# Patient Record
Sex: FEMALE | Race: WHITE
[De-identification: names, ages, dates, MRNs, and addresses within clinical notes are randomized per-mention and may not be internally consistent; named-entity substitution may affect disease eponyms.]

---

## 2017-09-21 ENCOUNTER — HOSPITAL ENCOUNTER (OUTPATIENT)
Dept: HOSPITAL 84 - D.MAMMO | Age: 67
Discharge: HOME | End: 2017-09-21
Attending: FAMILY MEDICINE
Payer: MEDICARE

## 2017-09-21 VITALS — BODY MASS INDEX: 31.2 KG/M2

## 2017-09-21 DIAGNOSIS — Z12.31: Primary | ICD-10-CM

## 2019-04-05 ENCOUNTER — HOSPITAL ENCOUNTER (OUTPATIENT)
Dept: HOSPITAL 84 - D.RAD | Age: 69
Discharge: HOME | End: 2019-04-05
Attending: ORTHOPAEDIC SURGERY
Payer: MEDICARE

## 2019-04-05 VITALS — BODY MASS INDEX: 31.2 KG/M2

## 2019-04-05 DIAGNOSIS — M53.3: Primary | ICD-10-CM

## 2019-07-31 ENCOUNTER — HOSPITAL ENCOUNTER (OUTPATIENT)
Dept: HOSPITAL 84 - D.MAMMO | Age: 69
Discharge: HOME | End: 2019-07-31
Attending: FAMILY MEDICINE
Payer: MEDICARE

## 2019-07-31 VITALS — BODY MASS INDEX: 31.2 KG/M2

## 2019-07-31 DIAGNOSIS — Z12.31: Primary | ICD-10-CM

## 2020-07-13 ENCOUNTER — HOSPITAL ENCOUNTER (OUTPATIENT)
Dept: HOSPITAL 84 - D.LABREF | Age: 70
Discharge: HOME | End: 2020-07-13
Attending: ORTHOPAEDIC SURGERY
Payer: MEDICARE

## 2020-07-13 ENCOUNTER — HOSPITAL ENCOUNTER (INPATIENT)
Dept: HOSPITAL 84 - D.SDCHOLD | Age: 70
LOS: 24 days | Discharge: HOME HEALTH | DRG: 470 | End: 2020-08-06
Attending: ORTHOPAEDIC SURGERY | Admitting: ORTHOPAEDIC SURGERY
Payer: MEDICARE

## 2020-07-13 VITALS — BODY MASS INDEX: 29.8 KG/M2 | HEIGHT: 72 IN | BODY MASS INDEX: 29.8 KG/M2 | WEIGHT: 220 LBS

## 2020-07-13 VITALS — BODY MASS INDEX: 31.2 KG/M2

## 2020-07-13 DIAGNOSIS — I10: ICD-10-CM

## 2020-07-13 DIAGNOSIS — E78.5: ICD-10-CM

## 2020-07-13 DIAGNOSIS — M17.11: Primary | ICD-10-CM

## 2020-07-13 DIAGNOSIS — F03.90: ICD-10-CM

## 2020-07-13 DIAGNOSIS — K58.9: ICD-10-CM

## 2020-07-13 DIAGNOSIS — E87.5: ICD-10-CM

## 2020-07-13 DIAGNOSIS — E55.9: ICD-10-CM

## 2020-07-13 DIAGNOSIS — F32.9: ICD-10-CM

## 2020-07-15 LAB
ANION GAP SERPL CALC-SCNC: 9.2 MMOL/L (ref 8–16)
APTT BLD: 24.3 SECONDS (ref 22.8–39.4)
BILIRUB SERPL-MCNC: NEGATIVE MG/DL
BUN SERPL-MCNC: 20 MG/DL (ref 7–18)
CALCIUM SERPL-MCNC: 9.2 MG/DL (ref 8.5–10.1)
CHLORIDE SERPL-SCNC: 104 MMOL/L (ref 98–107)
CO2 SERPL-SCNC: 33.6 MMOL/L (ref 21–32)
CREAT SERPL-MCNC: 1.1 MG/DL (ref 0.6–1.3)
ERYTHROCYTE [DISTWIDTH] IN BLOOD BY AUTOMATED COUNT: 13 % (ref 11.5–14.5)
GLUCOSE SERPL-MCNC: 95 MG/DL (ref 74–106)
GLUCOSE SERPL-MCNC: NEGATIVE MG/DL
HCT VFR BLD CALC: 45 % (ref 36–48)
HGB BLD-MCNC: 15.2 G/DL (ref 12–16)
INR PPP: 0.89 (ref 0.85–1.17)
KETONES UR STRIP-MCNC: NEGATIVE MG/DL
LYMPHOCYTES NFR BLD AUTO: 30.6 % (ref 15–50)
MCH RBC QN AUTO: 31.8 PG (ref 26–34)
MCHC RBC AUTO-ENTMCNC: 33.8 G/DL (ref 31–37)
MCV RBC: 94.1 FL (ref 80–100)
NEUTROPHILS NFR BLD AUTO: 60.4 % (ref 40–80)
NITRITE UR-MCNC: NEGATIVE MG/ML
OSMOLALITY SERPL CALC.SUM OF ELEC: 287 MOSM/KG (ref 275–300)
PH UR STRIP: 5 [PH] (ref 5–6)
PLATELET # BLD: 308 10X3/UL (ref 130–400)
PMV BLD AUTO: 9.7 FL (ref 7.4–10.4)
POTASSIUM SERPL-SCNC: 3.8 MMOL/L (ref 3.5–5.1)
PROTHROMBIN TIME: 12 SECONDS (ref 11.6–15)
RBC # BLD AUTO: 4.78 10X6/UL (ref 4–5.4)
SODIUM SERPL-SCNC: 143 MMOL/L (ref 136–145)
UROBILINOGEN UR-MCNC: NORMAL MG/DL
WBC # BLD AUTO: 8 10X3/UL (ref 4.8–10.8)

## 2020-07-15 NOTE — NUR
HR- 67, POX- 97%, RR- 16, BP RA- 186/84
PT STATED BP WASN'T NORMAL FOR HER BUT HAS BEEN STRESSED WITH WORK AND HAVING
PAIN IN KNEE. 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 ENCOURAGED TO
CHECK AT HOME TONIGHT AND DAILY TIL DOWN TO NORMAL RANGE FOR HER.

## 2020-08-03 LAB
ANION GAP SERPL CALC-SCNC: 9.2 MMOL/L (ref 8–16)
APTT BLD: 25.4 SECONDS (ref 22.8–39.4)
BASOPHILS NFR BLD AUTO: 0.5 % (ref 0–2)
BILIRUB SERPL-MCNC: NEGATIVE MG/DL
BUN SERPL-MCNC: 22 MG/DL (ref 7–18)
CALCIUM SERPL-MCNC: 9.1 MG/DL (ref 8.5–10.1)
CHLORIDE SERPL-SCNC: 106 MMOL/L (ref 98–107)
CO2 SERPL-SCNC: 34 MMOL/L (ref 21–32)
CREAT SERPL-MCNC: 1 MG/DL (ref 0.6–1.3)
EOSINOPHIL NFR BLD: 2.9 % (ref 0–7)
ERYTHROCYTE [DISTWIDTH] IN BLOOD BY AUTOMATED COUNT: 12.8 % (ref 11.5–14.5)
GLUCOSE SERPL-MCNC: 89 MG/DL (ref 74–106)
GLUCOSE SERPL-MCNC: NEGATIVE MG/DL
HCT VFR BLD CALC: 44.4 % (ref 36–48)
HGB BLD-MCNC: 14.9 G/DL (ref 12–16)
IMM GRANULOCYTES NFR BLD: 0.3 % (ref 0–5)
INR PPP: 0.89 (ref 0.85–1.17)
KETONES UR STRIP-MCNC: NEGATIVE MG/DL
LYMPHOCYTES NFR BLD AUTO: 32 % (ref 15–50)
MCH RBC QN AUTO: 32.4 PG (ref 26–34)
MCHC RBC AUTO-ENTMCNC: 33.6 G/DL (ref 31–37)
MCV RBC: 96.5 FL (ref 80–100)
MONOCYTES NFR BLD: 11.6 % (ref 2–11)
NEUTROPHILS NFR BLD AUTO: 52.7 % (ref 40–80)
NITRITE UR-MCNC: NEGATIVE MG/ML
OSMOLALITY SERPL CALC.SUM OF ELEC: 288 MOSM/KG (ref 275–300)
PH UR STRIP: 5 [PH] (ref 5–6)
PLATELET # BLD: 245 10X3/UL (ref 130–400)
PMV BLD AUTO: 10 FL (ref 7.4–10.4)
POTASSIUM SERPL-SCNC: 5.2 MMOL/L (ref 3.5–5.1)
PROTHROMBIN TIME: 12 SECONDS (ref 11.6–15)
RBC # BLD AUTO: 4.6 10X6/UL (ref 4–5.4)
SODIUM SERPL-SCNC: 144 MMOL/L (ref 136–145)
UROBILINOGEN UR-MCNC: NORMAL MG/DL
WBC # BLD AUTO: 6.6 10X3/UL (ref 4.8–10.8)

## 2020-08-04 VITALS — SYSTOLIC BLOOD PRESSURE: 127 MMHG | DIASTOLIC BLOOD PRESSURE: 59 MMHG

## 2020-08-04 VITALS — SYSTOLIC BLOOD PRESSURE: 140 MMHG | DIASTOLIC BLOOD PRESSURE: 80 MMHG

## 2020-08-04 VITALS — DIASTOLIC BLOOD PRESSURE: 63 MMHG | SYSTOLIC BLOOD PRESSURE: 118 MMHG

## 2020-08-04 VITALS — SYSTOLIC BLOOD PRESSURE: 113 MMHG | DIASTOLIC BLOOD PRESSURE: 60 MMHG

## 2020-08-04 VITALS — SYSTOLIC BLOOD PRESSURE: 116 MMHG | DIASTOLIC BLOOD PRESSURE: 67 MMHG

## 2020-08-04 VITALS — DIASTOLIC BLOOD PRESSURE: 92 MMHG | SYSTOLIC BLOOD PRESSURE: 162 MMHG

## 2020-08-04 VITALS — SYSTOLIC BLOOD PRESSURE: 125 MMHG | DIASTOLIC BLOOD PRESSURE: 82 MMHG

## 2020-08-04 VITALS — DIASTOLIC BLOOD PRESSURE: 88 MMHG | SYSTOLIC BLOOD PRESSURE: 140 MMHG

## 2020-08-04 VITALS — SYSTOLIC BLOOD PRESSURE: 101 MMHG | DIASTOLIC BLOOD PRESSURE: 60 MMHG

## 2020-08-04 VITALS — SYSTOLIC BLOOD PRESSURE: 154 MMHG | DIASTOLIC BLOOD PRESSURE: 95 MMHG

## 2020-08-04 PROCEDURE — 0SRC0J9 REPLACEMENT OF RIGHT KNEE JOINT WITH SYNTHETIC SUBSTITUTE, CEMENTED, OPEN APPROACH: ICD-10-PCS | Performed by: ORTHOPAEDIC SURGERY

## 2020-08-04 NOTE — NUR
PT GOT UP TO GO TO THE BR USING A WALKER AND TWO NURSES TO ASSIST.  SHE DID
VERY WELL GETTING UP.  SHE VOIDED QS.  SHE GOT DIZZY WHILE ON THE TOILET.  SHE
STATES SHE NEEDED TO GET BACK IN BED.  SHE USED THE WALKER TO GET BACK TO BED
WITH TWO ASSIST AND FEELS MUCH BETTER.  SCD'S ON NOW.
PT IS USING HER IS AND CAN GET THE METER TO 2000.  THIS WAS HER FISRT TIME
OOOB SINCE HER SURGERY THIS MORNING.

## 2020-08-04 NOTE — NUR
VS TAKEN AND CHARTED.  PT IS ON THE CPM MACHINE AT THIS TIME LAYING ON HER
BACK.  SHE HAS NO C/O OR NEEDS AT THIS TIME.  WATER WAS DELIVERED TO THE PT
AND HER .

## 2020-08-04 NOTE — NUR
RECEIVED TO ROOM 1211 VIA BED FROM PACU. A/O X3. SOMEWHAT LETHARGIC AT THIS
TIME.  AT BEDSIDE. DRESSING TO RIGHT KNEE IS DRY AND INTACT. DENIES
NEEDS.

## 2020-08-04 NOTE — NUR
CAUTERY PAD PLACED ON RIGHT FLANK. PLASMA BLADE USED ON SETTING 6/8.
AQAUMANTIS USED ON SETTING 170. CAUTERY PAD LOT #[id number] EXP.
08/13/2021.

## 2020-08-04 NOTE — NUR
ATE ONLY A FEW BITES OF SPAGETTI BUT ALL OF THE REST. DENIES NEEDS. CPM PLACED
AT 1730. NO CHANGES NOTED.

## 2020-08-05 VITALS — SYSTOLIC BLOOD PRESSURE: 161 MMHG | DIASTOLIC BLOOD PRESSURE: 67 MMHG

## 2020-08-05 VITALS — DIASTOLIC BLOOD PRESSURE: 61 MMHG | SYSTOLIC BLOOD PRESSURE: 139 MMHG

## 2020-08-05 VITALS — DIASTOLIC BLOOD PRESSURE: 56 MMHG | SYSTOLIC BLOOD PRESSURE: 125 MMHG

## 2020-08-05 VITALS — DIASTOLIC BLOOD PRESSURE: 58 MMHG | SYSTOLIC BLOOD PRESSURE: 129 MMHG

## 2020-08-05 VITALS — SYSTOLIC BLOOD PRESSURE: 142 MMHG | DIASTOLIC BLOOD PRESSURE: 63 MMHG

## 2020-08-05 LAB
ANION GAP SERPL CALC-SCNC: 11.4 MMOL/L (ref 8–16)
BASOPHILS NFR BLD AUTO: 0.1 % (ref 0–2)
BUN SERPL-MCNC: 16 MG/DL (ref 7–18)
CALCIUM SERPL-MCNC: 8.2 MG/DL (ref 8.5–10.1)
CHLORIDE SERPL-SCNC: 103 MMOL/L (ref 98–107)
CO2 SERPL-SCNC: 28.7 MMOL/L (ref 21–32)
CREAT SERPL-MCNC: 1 MG/DL (ref 0.6–1.3)
EOSINOPHIL NFR BLD: 0 % (ref 0–7)
ERYTHROCYTE [DISTWIDTH] IN BLOOD BY AUTOMATED COUNT: 12.8 % (ref 11.5–14.5)
GLUCOSE SERPL-MCNC: 126 MG/DL (ref 74–106)
HCT VFR BLD CALC: 35.2 % (ref 36–48)
HGB BLD-MCNC: 11.4 G/DL (ref 12–16)
IMM GRANULOCYTES NFR BLD: 0.3 % (ref 0–5)
LYMPHOCYTES NFR BLD AUTO: 12.3 % (ref 15–50)
MAGNESIUM SERPL-MCNC: 1.8 MG/DL (ref 1.8–2.4)
MCH RBC QN AUTO: 31.1 PG (ref 26–34)
MCHC RBC AUTO-ENTMCNC: 32.4 G/DL (ref 31–37)
MCV RBC: 95.9 FL (ref 80–100)
MONOCYTES NFR BLD: 12.6 % (ref 2–11)
NEUTROPHILS NFR BLD AUTO: 74.7 % (ref 40–80)
OSMOLALITY SERPL CALC.SUM OF ELEC: 280 MOSM/KG (ref 275–300)
PLATELET # BLD: 167 10X3/UL (ref 130–400)
PMV BLD AUTO: 10.6 FL (ref 7.4–10.4)
POTASSIUM SERPL-SCNC: 4.1 MMOL/L (ref 3.5–5.1)
RBC # BLD AUTO: 3.67 10X6/UL (ref 4–5.4)
SODIUM SERPL-SCNC: 139 MMOL/L (ref 136–145)
WBC # BLD AUTO: 11.5 10X3/UL (ref 4.8–10.8)

## 2020-08-05 NOTE — NUR
PT C/O OF PAIN IN HER RIGHT KNEE AND HEEL.  PO PAIN MED, OXY 10, AND VISTERAL
GIVEN PO.  SHE REQUESTED A COKE AND LIP MOISTURIZER  AND BOTH WERE TAKEN TO
HER.  WE TURNED OFF THE TV AND TURNED OUT THE LIGHTS SO SHE CAN REALLY TRY TO
SLEEP.  PT GOT UP TO VOID BEFORE SHE ASKED FOR PAIN MEDS.  SHE DOES VERY WELL
WITH THE WALKER TO AND FROM THE BATHROOM WITH NURSE JUST WALKING BEHIND HER.
SHE STATED ONCE SHE WAS ON THE TOILET THAT SHE WAS DIZZY.  SHE GOT UP AND
AMBULATED WITH WALKER BACK TO BED WITHOUT INCIDENCE.

## 2020-08-05 NOTE — NUR
PAIN MEDS TAKEN TO PT.  I WROTE ON HER COMMUNICATION BOARD
THE TIME FOR HER NEXT PAIN MEDS.  SHE ALSO NEEDED HELP TO THE BATHROOM.  SHE
WAS ASSISTED USING HER WALKER.  SHE DOES WILL AMBULATING TO THE BR.  SHE WAS
TAKEN OFF THE CPM TO GO TO THE BR BUT PUT BACK ON TO FINISH HER TIME.
HER  IS IN THE ROOM AT THIS TIME.

## 2020-08-05 NOTE — OP
PATIENT NAME:  TIMOTHY GONZALEZ                          MEDICAL RECORD: [id number]
:46                                             [facility]     D.12[facility]
                                                         ADMISSION DATE:20
SURGEON:  CHAD BLAKE,          
 
 
DATE OF OPERATION:  2020
 
PROCEDURE PERFORMED:  Right total knee arthroplasty.
 
PREOPERATIVE DIAGNOSIS:  Right knee osteoarthritis.
 
POSTOPERATIVE DIAGNOSIS:  Right knee osteoarthritis.
 
INDICATIONS:  Ms. Gonzalez is a 70-year-old female who has had knee pain for quite
some time.  She has been treated with injections and physical therapy to no
avail.  She is tired of dealing with pain and wants something done surgically. 
This is affecting her activities of daily living.  She is aware of the risks and
benefits of the procedure including infection, bleeding, need for further
surgery, implant failure, fracture, blood clots, and even death and she signed
the consent as well as damage to nerves and vessels in the area.
 
SURGEON:  Chad Blake DO
 
ANESTHESIA:  José Manuel Martin, certified surgical first assist.
 
DESCRIPTION OF PROCEDURE:  The patient was taken to the operative suite, laid in
supine position, given general anesthetic, and LMA was placed.  She was also
given a block by anesthesia in preoperative area.  The patient was given 2 g of
Ancef, 80 mg of gentamicin and no TXA as she had a blood clot history.  The
right lower extremity was then prepped and draped in sterile fashion.  A
time-out was performed and everyone was in agreeance with the correct side,
site, patient, and procedure.  We marked out the incision on the anterior knee,
covered in Ioban, and made an incision down through the skin to the capsule with
10 blade scalpel.  We used fresh 10 blade.  I did a medial parapatellar
approach.  As soon as I did that, we coagulated the vessels with Aquamantys
going through and then everted the patella.  Upon doing that, there were 2 large
loose bodies that popped out in the anterior knee, round in nature.  These were
sent to the lab also with the bone that was sent at the end of the case.  I then
removed a part of the fat pad and milled down the patella for the implant, then
entered into the femoral canal after taking out the ACL with a drill.  I then
cut the distal femur.  I then cut the proximal tibia both off guides.  I then
removed the menisci and cut more proximal tibia to fit the 10 extension block
in.  I then sized the femur to be a 70.  I then used four-in-one cutting block
and an thomas wing to make sure there was no notching.  I then used the
four-in-one cutting block through that to cut the chamfer cuts and the anterior
and posterior cuts.  This was removed as well as the bone and then the 70 trial
was put on, floated in the tibial tray with poly and marked the rotation.  I
then drilled the patella and lug holes on the femur.  I then exposed the tibia
and reamed and punched tibia and put extra holes in the tibia for the cement. 
This was then irrigated out.  The cement was mixed, placed on the implant and on
the tibia.  I then impacted the implant on.  The excess cement was then removed.
 I then impacted again.  The excess cement was removed.  The femur was then
impacted on.  A 10 poly was put in between them, brought into extension, and the
patella was cleaned out with irrigation and curet and then cement placed in the
holes and on the implant and squeezed into place.  Excess cement was removed.  I
then used 10% povidone-iodine and 500 mL of normal saline solution, put it in
the knee and let it sit for 3 minutes and irrigated out with a liter of normal
 
 
 
OPERATIVE REPORT                               [id number]    TIMOTHY GONZALEZ        
 
 
saline.  I then trialed the 10 poly, had some medial laxity and put in a 12
poly, fit very well, ranged extremity well on extension and flexion, had good
stability in mid flexion as well and along with an anterior stabilized E poly
with 12 and locked it into place.  The knee ranged very well with this and then
the Nena and vancomycin and tobramycin powder was placed in the knee.  I then
closed the capsule with #1 Vicryl pop-offs and figure-of-eight.  This was done
by myself, José Manuel Martin and once the capsule was closed, José Manuel Martin closed the
skin with 2-0 Vicryl in interrupted fashion and then ZipLine placed on the knee,
dressed with Adaptic, 4 x 4, ABD, Webril, Ace wrap, and BRYCE hose stocking up to
the knee.  She was then awakened and taken to recovery in stable condition.
 
ESTIMATED BLOOD LOSS:  Approximately 250 mL.
 
COMPLICATIONS:  None.
 
[id number] Voice Confirmation ID: [id number] DOCUMENT ID: [id number]
                                           
                                           CHAD BLAKE DO         
 
 
 
Electronically Signed by CHAD MEDRANO on 20 at 1306
 
 
 
 
 
 
 
 
 
 
 
 
 
 
 
 
 
 
 
 
 
 
 
 
 
CC:                                                             [id number]
DICTATION DATE: 20 1230     :     20 2208      ADM IN  
                                                                              
[facility]                                          
[address] [id number]

## 2020-08-05 NOTE — NUR
PAIN MEDS GIVEN PO PER ORDER.  VS TAKEN FOR MIDNIGHT. HER O2 SATS ARE A LITTLE
DECREASED RIGHT AFTER SHE IS WAKED UP.  THE LIGHTS HAVE BEEN TURNED OUT AND PT
IS GOING TO TRY TO SLEEP. SHE HAS FRESH WATER AT HER BEDSIDE.

## 2020-08-05 NOTE — NUR
PT ALERT X 4. BREATH SOUNDS CLEAR BILAT. IV TO LEFT FOREARM, PATENT, DRESSING
CDI. ACE TO RIGHT LEG CDI, CPM REMOVED AT 0745. PT REPORTING PAIN OF 7/10,
MEDICATED PER ORDERS, WILL CONTINUE TO MONITOR. AMBULATED TO BATHROOM AND BACK
TO BED WITH NO COMPLICATIONS. SCD'S IN USE. BED LOW, CALL LIGHT IN REACH. NO
OTHER NEEDS AT THIS TIME.

## 2020-08-05 NOTE — NUR
PT OFF THE CPM NOW.  IS GOING HOME FOR THE EVENING.
PT ASSESSED.  SKIN IS WARM AND DRY. HEART SOUNDS REGULAR, LUNGS CLEAR, BS
HEARD.  PT IS PASSING GAS BUT HAS NOT HAD A BM.  HER URINE IS STILL ALITTLE
CONCENTRATED.  SHE HAS BEEN ENCOURAGED TO DRINK MORE WATER.  HER RIGHT KNEE IS
STILL WRAPPED IN AN ACE BANDAGE AND I SEE NO DRAINAGE THROUGH THE ACE.  HER IV
WAS SALINE LOCKED.  THE SITE LOOKS GOOD WITH NO REDNESS OR LEAKING.  SHE
STATES THIS MAKES IT SO MUCH EASIER TO GET UP TO THE BR.  SHE IS STILL WEARING
HER SCD'S WITHOUT C/O.  SHE IS STILL USING HER IS WHEN SHE IS AWAKE 10 TIMES
AN HOUR. SHE ASKED ABOUT MOVING FROM SIDE TO SIDE AS TOLERATED AND TOLD HER IT
WAS OK UNLESS SHE FELT PAIN.  SHE IS STILL WEARING TELE.

## 2020-08-05 NOTE — NUR
PT CALLED FOR HELP UP TO THE BR.  THIS WAS DONE WITH NO PROBLEMS.  I NOTICED
THAT IT WAS TIME FOR HER PAIN MEDS AGAIN.

## 2020-08-06 VITALS — SYSTOLIC BLOOD PRESSURE: 106 MMHG | DIASTOLIC BLOOD PRESSURE: 67 MMHG

## 2020-08-06 VITALS — DIASTOLIC BLOOD PRESSURE: 88 MMHG | SYSTOLIC BLOOD PRESSURE: 165 MMHG

## 2020-08-06 VITALS — DIASTOLIC BLOOD PRESSURE: 72 MMHG | SYSTOLIC BLOOD PRESSURE: 139 MMHG

## 2020-08-06 VITALS — DIASTOLIC BLOOD PRESSURE: 61 MMHG | SYSTOLIC BLOOD PRESSURE: 135 MMHG

## 2020-08-06 LAB
ANION GAP SERPL CALC-SCNC: 6.6 MMOL/L (ref 8–16)
BASOPHILS NFR BLD AUTO: 0.1 % (ref 0–2)
BUN SERPL-MCNC: 12 MG/DL (ref 7–18)
CALCIUM SERPL-MCNC: 8.6 MG/DL (ref 8.5–10.1)
CHLORIDE SERPL-SCNC: 101 MMOL/L (ref 98–107)
CO2 SERPL-SCNC: 33.9 MMOL/L (ref 21–32)
CREAT SERPL-MCNC: 0.9 MG/DL (ref 0.6–1.3)
EOSINOPHIL NFR BLD: 0.4 % (ref 0–7)
ERYTHROCYTE [DISTWIDTH] IN BLOOD BY AUTOMATED COUNT: 12.6 % (ref 11.5–14.5)
GLUCOSE SERPL-MCNC: 109 MG/DL (ref 74–106)
HCT VFR BLD CALC: 29.8 % (ref 36–48)
HGB BLD-MCNC: 9.6 G/DL (ref 12–16)
IMM GRANULOCYTES NFR BLD: 0.3 % (ref 0–5)
LYMPHOCYTES NFR BLD AUTO: 16.7 % (ref 15–50)
MAGNESIUM SERPL-MCNC: 1.9 MG/DL (ref 1.8–2.4)
MCH RBC QN AUTO: 30.9 PG (ref 26–34)
MCHC RBC AUTO-ENTMCNC: 32.2 G/DL (ref 31–37)
MCV RBC: 95.8 FL (ref 80–100)
MONOCYTES NFR BLD: 13.2 % (ref 2–11)
NEUTROPHILS NFR BLD AUTO: 69.3 % (ref 40–80)
OSMOLALITY SERPL CALC.SUM OF ELEC: 276 MOSM/KG (ref 275–300)
PLATELET # BLD: 198 10X3/UL (ref 130–400)
PMV BLD AUTO: 10.1 FL (ref 7.4–10.4)
POTASSIUM SERPL-SCNC: 3.5 MMOL/L (ref 3.5–5.1)
RBC # BLD AUTO: 3.11 10X6/UL (ref 4–5.4)
SODIUM SERPL-SCNC: 138 MMOL/L (ref 136–145)
WBC # BLD AUTO: 10.4 10X3/UL (ref 4.8–10.8)

## 2020-08-06 NOTE — MORECARE
CASE MANAGEMENT DISCHARGE SUMMARY
 
 
PATIENT: TIMOTHY GONZALEZ                    UNIT: [id number]
ACCOUNT#: [id number]                       ADM DATE: 20
AGE: 70     : [birth year]  SEX: F            ROOM/BED: D.1211    
AUTHOR: LEIA ORANTES                             PHYSICIAN:                               
 
REFERRING PHYSICIAN: CORNELL BLAKE DO         
DATE OF SERVICE: 20
Discharge Plan
 
 
Patient Name: TIMOTHY GONZALEZ
Facility: [facility]:[place]
Encounter #: [id number]
Medical Record #: [id number]
: 1950
Planned Disposition: Home Health Service
Anticipated Discharge Date: 
 
Discharge Date: 
Expected LOS: 
Initial Reviewer: [id number]
Initial Review Date: 2020
Generated: 20   3:11 pm 
  
 
 
External Providers
External Provider: [facility]
 
Next Contact Date: 
Service Request Date: 
Service Type: 
Resolution: 
 
Reviewer: 
Comments: 
 
 
 
 
 
Patient Name: TIMOTHY GONZALEZ
 
Encounter #: [id number]
Page [id number]
 
 
 
 
 
Electronically Signed by LEIA ORANTES on 20 at 1412
 
 
 
 
 
 
**All edits/amendments must be made on the electronic document**
 
DICTATION DATE: 20 1411     : YOUSUF  20 1411     
RPT#: [id number]                                [place] DATE:        
                                               STATUS: ADM IN  
[facility]
 [place] [address]
***END OF REPORT***

## 2020-08-06 NOTE — NUR
DRESSING TO RIGHT KNEE CHANGED PER ORDER. PT TOLERATED WELL. BED IS IN THE
LOWEST POSITION. CALL LIGHT AND BEDSIDE TABLE ARE WITHIN REACH. SIDE RAILS X
2. PT DENIES FURTHER NEEDS. WILL CONT TO MONITOR.

## 2020-08-06 NOTE — NUR
ALL DISCHARGE INSTRUCTIONS COVERED WITH PT AND PT SPOUSE. PIV TO LEFT FA
REMOVED WITH CATHETER TIP INTACT. DRESSING APPLIED. PRINTED RX X 4 GIVEN TO
PT. PT DENIES FURTHER QUESTIONS/CONCERNS/NEEDS AT THIS TIME. ALL DISCHARGE
PAPERS SIGNED. CNA SHARON ESCORTS PT FROM ROOM VIA WHEELCHAIR. PT THANKS THIS
NURSE AND CNA FOR CARE GIVEN DURING THIS SHIFT. ALL SIGNED DISCHARGE PAPERS
PLACED IN PT CHART.

## 2020-08-06 NOTE — MORECARE
CASE MANAGEMENT DISCHARGE SUMMARY
 
 
PATIENT: TIMOTHY GONZALEZ                    UNIT: [id number]
ACCOUNT#: [id number]                       ADM DATE: 20
AGE: 70     : [birth year]  SEX: F            ROOM/BED: D.1211    
AUTHOR: LEIA ORANTES                             PHYSICIAN:                               
 
REFERRING PHYSICIAN: CORNELL BLAKE DO         
DATE OF SERVICE: 20
Discharge Plan
 
 
Patient Name: TIMOTHY GONZALEZ
Facility: [facility]
Encounter #: [id number]
Medical Record #: [id number]
: 1950
Planned Disposition: Home Health Service
Anticipated Discharge Date: 
 
Discharge Date: 
Expected LOS: 
Initial Reviewer: [email or web address]
Initial Review Date: 2020
Generated: 20   3:19 pm 
Comments
 
DCP- Discharge Planning
 
Updated by [id number]: Yasemin Wilkins on 20   1:13 pm CT
Patient Name: TIMOTHY GONZALEZ                                     
Admission Status: Elective   
Accout number: [id number]                              
Admission Date: 2020   
: 1950                                                        
Admission Diagnosis:   
Attending: CORNELL BLAKE                                                
Current LOS:  2   
  
Anticipated DC Date:    
Planned Disposition: Home Health Service   
Primary Insurance: MEDICARE A & B   
  
  
Discharge Planning Comments: CM met with patient at bedside after explaining 
CM role and obtaining verbal consent. CM discussed availability / needs of 
home health, REHAB and medical equipment. PATIENT STATES HAS ALL EQUIPMENT 
NEEDED. WOULD LIKE TO USE HH FOR PT. [facility] SIGNED FOR [facility] OR ANY THAT WILL 
ACCEPT. CM TO FOLLOW AND ASSIST AS NEEDED. REFERRAL SENT TO [facility]. WAITING 
 
CALL BACK.   
  
  
  
  
  
  
: Yasemin Wilkins
 DCPIA - Discharge Planning Initial Assessment
 
Updated by [id number]: Yasemin Wilkins on 20   2:11 pm
*  Is the patient Alert and Oriented?
Yes
*  PCP
[facility]
*  Pharmacy
[facility]
*  Preadmission Environment
Home with Family
*  ADLs
Independent
*  Other Equipment
CPM, WALKER,ICE MACHINE
*  Community resources currently utilized
None
*  Additional services required to return to the preadmission environment?
Yes
*  Can the patient safely return to the preadmission environment?
Yes
*  Has this patient been hospitalized within the prior 30 days at any 
hospital?
No
 
 
 
 
 
Coverage Notice
 
Reviewer: [id number] - Yasemin Wilkins
 
Notice Issued Date-Time: 2020  14:14
Notice Type: Patient Choice Letter
 
Notice Delivered To: 
Relationship to Patient: 
Representative Name: 
 
Delivery Method:  - 
Pat Days:
Prior Verbal Notification: 
 
 
Recipient Understood Notice: 
Recipient Signature: 
Med Rec Note Co-signed by Attending:
 
Coverage Notice Comment:  CARE 4 OR ANY
 
Last DP export: 20   1:12 pm
Patient Name: TIMOTHY GONZALEZ
Encounter #: [id number]
Page [id number]
 
 
 
 
 
Electronically Signed by LEIA ORANTES on 20 at 1420
 
 
 
 
 
 
**All edits/amendments must be made on the electronic document**
 
DICTATION DATE: 20     : YOUSUF  20 141     
RPT#: [id number]                                DC DATE:        
                                               STATUS: ADM IN  
[facility]
[address] [place] [address]
***END OF REPORT***

## 2020-08-06 NOTE — NUR
VS TAKEN AND RECORDED.  PT ASKED IF SHE COULD BE PUT ON THE CPM MACHINE EARLY
TODAY.  SHE IS NOW ON THE CPM MACHINE.  PT ALSO REQUESTED PAIN MEDS.  PERCOCET
10 AND VISTARIL WERE GIVEN.  PT IS GOING TO TRY TO SLEEP FOR THE REST OF THE
NIGHT.

## 2020-08-06 NOTE — MORECARE
CASE MANAGEMENT DISCHARGE SUMMARY
 
 
PATIENT: TIMOTHY GONZALEZ                    UNIT: [id number]
ACCOUNT#: [id number]                       ADM DATE: 20
AGE: 70     : 46  SEX: F            ROOM/BED: D.1211    
AUTHOR: ROLANDA,DOC                             PHYSICIAN:                               
 
REFERRING PHYSICIAN: CORNLEL BLAKE DO         
DATE OF SERVICE: 20
Discharge Plan
 
 
Patient Name: TIMOTHY GONZALEZ
Facility: [facility]:[place]
Encounter #: [id number]
Medical Record #: [id number]
: 1950
Planned Disposition: Home Health Service
Anticipated Discharge Date: 
 
Discharge Date: 2020
Expected LOS: 
Initial Reviewer: [email or web address]
Initial Review Date: 2020
Generated: 20   5:08 pm 
Comments
 
DCP- Discharge Planning
 
Updated by [id number]: Yasemin Wilkins on 20   1:13 pm CT
Patient Name: TIMOTHY GONZALEZ                                     
Admission Status: Elective   
Accout number: [id number]                              
Admission Date: 2020   
: 1950                                                        
Admission Diagnosis:   
Attending: CORNELL BLAKE                                                
Current LOS:  2   
  
Anticipated DC Date:    
Planned Disposition: Home Health Service   
Primary Insurance: MEDICARE A & B   
  
  
Discharge Planning Comments: CM met with patient at bedside after explaining 
CM role and obtaining verbal consent. CM discussed availability / needs of 
home health, REHAB and medical equipment. PATIENT STATES HAS ALL EQUIPMENT 
NEEDED. WOULD LIKE TO USE HH FOR PT. [facility]WALDO SIGNED FOR [facility] OR ANY THAT WILL 
ACCEPT. CM TO FOLLOW AND ASSIST AS NEEDED. REFERRAL SENT TO [facility]. WAITING 
 
CALL BACK.   
  
  
  
  
  
  
: Yasemin Wilkins
 DCPIA - Discharge Planning Initial Assessment
 
Updated by [id number]: Yasemin Wilkins on 20   2:11 pm
*  Is the patient Alert and Oriented?
Yes
*  PCP
[facility]
*  Pharmacy
[facility]
*  Preadmission Environment
Home with Family
*  ADLs
Independent
*  Other Equipment
CPM, WALKER,ICE MACHINE
*  Community resources currently utilized
None
*  Additional services required to return to the preadmission environment?
Yes
*  Can the patient safely return to the preadmission environment?
Yes
*  Has this patient been hospitalized within the prior 30 days at any 
hospital?
No
 
 
 
 
 
Coverage Notice
 
Reviewer: [id number] - Yasemin Wilkins
 
Notice Issued Date-Time: 2020  14:14
Notice Type: Patient Choice Letter
 
Notice Delivered To: 
Relationship to Patient: 
Representative Name: 
 
Delivery Method:  - 
Pat Days:
Prior Verbal Notification: 
 
 
Recipient Understood Notice: 
Recipient Signature: 
Med Rec Note Co-signed by Attending:
 
Coverage Notice Comment:  CARE 4 OR ANY
 
Last DP export: 20   1:20 pm
Patient Name: TIMOTHY GONZALEZ
Encounter #: [id number]
Page [phone]
 
 
 
 
 
Electronically Signed by LEIA ORANTES on 20 at 1608
 
 
 
 
 
 
**All edits/amendments must be made on the electronic document**
 
DICTATION DATE: 20     : YOUSUF  20 1608     
RPT#: [id number]                                DC DATE:20
                                               STATUS: DIS IN  
[facility]
[address] [place] [address]
***END OF REPORT***

## 2020-08-06 NOTE — NUR
PT IS RESTING IN BED WITH EYES OPEN. RESPIRATIONS ARE EVEN AND UNLABORED. PT
IS AAOX 4. DRESSING TO RIGHT KNEE NOTED AND IS CDI. SCDS ON BLE. PT DENIES
PRESENCE OF NUMBNESS/TINGLING TO BLE. BILATERAL PEDAL PULSES ARE PALP. PT
DENIES PRESENCE OF PAIN/N/V AT THIS TIME. BED IS IN THE LOWEST POSITION. CALL
LIGHT AND BEDSIDE TABLE ARE WITHIN REACH. SIDE RAILS X 2. PT DENIES FURTHER
NEEDS. WILL CONT TO MONITOR.

## 2021-03-30 ENCOUNTER — HOSPITAL ENCOUNTER (OUTPATIENT)
Dept: HOSPITAL 84 - D.LABREF | Age: 71
Discharge: HOME | End: 2021-03-30
Attending: NURSE PRACTITIONER
Payer: MEDICARE

## 2021-03-30 VITALS — BODY MASS INDEX: 29.8 KG/M2

## 2021-03-30 DIAGNOSIS — M25.561: Primary | ICD-10-CM

## 2021-03-30 LAB
BASOPHILS NFR BLD AUTO: 0.4 % (ref 0–2)
EOSINOPHIL NFR BLD: 4.1 % (ref 0–7)
ERYTHROCYTE [DISTWIDTH] IN BLOOD BY AUTOMATED COUNT: 13.3 % (ref 11.5–14.5)
ERYTHROCYTE [SEDIMENTATION RATE] IN BLOOD: 5 MM/HR (ref 0–30)
HCT VFR BLD CALC: 45.2 % (ref 36–48)
HGB BLD-MCNC: 14.8 G/DL (ref 12–16)
IMM GRANULOCYTES NFR BLD: 0.2 % (ref 0–5)
LYMPHOCYTES # BLD: 1.65 10X3/UL (ref 1.18–3.74)
LYMPHOCYTES NFR BLD AUTO: 33.5 % (ref 15–50)
MCH RBC QN AUTO: 31 PG (ref 26–34)
MCHC RBC AUTO-ENTMCNC: 32.7 G/DL (ref 31–37)
MCV RBC: 94.6 FL (ref 80–100)
MONOCYTES NFR BLD: 12.6 % (ref 2–11)
NEUTROPHILS # BLD: 2.43 10X3/UL (ref 1.56–6.13)
NEUTROPHILS NFR BLD AUTO: 49.2 % (ref 40–80)
PLATELET # BLD: 288 10X3/UL (ref 130–400)
PMV BLD AUTO: 10.7 FL (ref 7.4–10.4)
RBC # BLD AUTO: 4.78 10X6/UL (ref 4–5.4)
WBC # BLD AUTO: 4.9 10X3/UL (ref 4.8–10.8)

## 2021-05-28 ENCOUNTER — HOSPITAL ENCOUNTER (OUTPATIENT)
Dept: HOSPITAL 84 - D.MAMMO | Age: 71
Discharge: HOME | End: 2021-05-28
Attending: FAMILY MEDICINE
Payer: MEDICARE

## 2021-05-28 VITALS — BODY MASS INDEX: 29.8 KG/M2

## 2021-05-28 DIAGNOSIS — Z12.31: Primary | ICD-10-CM
